# Patient Record
Sex: MALE | Race: WHITE | NOT HISPANIC OR LATINO | URBAN - METROPOLITAN AREA
[De-identification: names, ages, dates, MRNs, and addresses within clinical notes are randomized per-mention and may not be internally consistent; named-entity substitution may affect disease eponyms.]

---

## 2017-04-10 ENCOUNTER — EMERGENCY (EMERGENCY)
Facility: HOSPITAL | Age: 27
LOS: 1 days | Discharge: HOME | End: 2017-04-10
Attending: EMERGENCY MEDICINE
Payer: SELF-PAY

## 2017-04-10 VITALS
OXYGEN SATURATION: 100 % | DIASTOLIC BLOOD PRESSURE: 56 MMHG | SYSTOLIC BLOOD PRESSURE: 121 MMHG | TEMPERATURE: 99 F | HEART RATE: 66 BPM | HEIGHT: 70 IN | WEIGHT: 154.98 LBS | RESPIRATION RATE: 16 BRPM

## 2017-04-10 DIAGNOSIS — K11.20 SIALOADENITIS, UNSPECIFIED: ICD-10-CM

## 2017-04-10 DIAGNOSIS — Z88.0 ALLERGY STATUS TO PENICILLIN: ICD-10-CM

## 2017-04-10 PROCEDURE — 99283 EMERGENCY DEPT VISIT LOW MDM: CPT

## 2017-04-10 RX ORDER — OXYCODONE HYDROCHLORIDE 5 MG/1
1 TABLET ORAL
Qty: 6 | Refills: 0 | OUTPATIENT
Start: 2017-04-10 | End: 2017-04-13

## 2017-04-10 RX ORDER — EMTRICITABINE AND TENOFOVIR DISOPROXIL FUMARATE 200; 300 MG/1; MG/1
1 TABLET, FILM COATED ORAL
Qty: 30 | Refills: 0 | OUTPATIENT
Start: 2017-04-10 | End: 2017-05-10

## 2017-04-10 NOTE — ED ADULT NURSE NOTE - OBJECTIVE STATEMENT
STATES FOR THE PAST 4 DAYS , PATIENT HAS LEFT SIDE OF THROAT, WITH PUS AND BLEEDING , WITH CHILLS. STATES FOR THE PAST 4 DAYS , PATIENT HAS LEFT SIDE OF THROAT PAIN , WITH PUS AND BLEEDING  FROM LEFT SIDE OF THROAT. , WITH CHILLS. WITH HX OF LEFT SALIVARY GLANDS STONES WITH SURGERY.ABOUT A YEAR AGO. STATES FOR THE PAST 4 DAYS , PATIENT HAS LEFT SIDE OF THROAT PAIN , WITH PUS AND BLEEDING  FROM LEFT SIDE OF THROAT. , WITH CHILLS. WITH HX OF LEFT SALIVARY GLANDS STONES EXTRACTION  SURGERY.ABOUT 2 YEARS AGO.

## 2017-04-10 NOTE — ED PROVIDER NOTE - MEDICAL DECISION MAKING DETAILS
25 y/o M pt with Hx of salivary stones with severe infection requiring drainage; pt is afebrile, normal vital signs, well-appearing, on Abx's and has close f/u. Plan for pain control, and will speak with ENT. 25 y/o M pt with Hx of salivary stones with severe infection requiring drainage; pt is afebrile, normal vital signs, well-appearing, on Abx's and has close f/u. Plan for pain control, already has close ENT followup.

## 2017-04-10 NOTE — ED PROVIDER NOTE - PLAN OF CARE
Please take Motrin for pain (400-600mg every 6-8 hours). Take percocet for severe pain. Continue to take antibiotics.  Please follow up with your ENT doctor as scheduled for further evaluation. Return to the ER for increased pain, fever or any other concern.

## 2017-04-10 NOTE — ED PROVIDER NOTE - NS ED MD SCRIBE ATTENDING SCRIBE SECTIONS
REVIEW OF SYSTEMS/PHYSICAL EXAM/PAST MEDICAL/SURGICAL/SOCIAL HISTORY/HISTORY OF PRESENT ILLNESS/VITAL SIGNS( Pullset)/DISPOSITION/HIV

## 2017-04-10 NOTE — ED PROVIDER NOTE - CARE PLAN
Principal Discharge DX:	Salivary gland infection  Instructions for follow-up, activity and diet:	Please take Motrin for pain (400-600mg every 6-8 hours). Take percocet for severe pain. Continue to take antibiotics.  Please follow up with your ENT doctor as scheduled for further evaluation. Return to the ER for increased pain, fever or any other concern.

## 2017-04-10 NOTE — ED PROVIDER NOTE - DETAILS:
The scribe's documentation has been prepared under my direction and personally reviewed by me in its entirety. I confirm that the note above accurately reflects all work, treatment, procedures, and medical decision making performed by me. ESTEFANÍA Rico MD

## 2017-04-10 NOTE — ED PROVIDER NOTE - MOUTH
sublingual swelling and purulent drainage expressed from salivary gland; no submental tenderness, no erythema or warmth

## 2017-04-10 NOTE — ED PROVIDER NOTE - OBJECTIVE STATEMENT
25 y/o M pt with PMHx of Salivary Stones (s/p excision) and no PSHx presents to ED c/o oral pain, oral swelling, and sublingual discharge (pus and blood) x4 days. Pt states he had salivary gland stone excision with drainage approximately 2 years ago in which a 3mm stone lodged in his gland was removed; pt has not been able to see ENT physician who performed the procedure since then, but was informed that he does have two other salivary gland stones that should not cause any issues, as per ENT physician. Pt's pain returned 4 days ago, which prompted pt to visit an ED 3 days ago; pt was given Ibuprofen and Clindamycin. Pt reports chills at night as well. Pt denies fever, nausea, vomiting, rashes, or any other complaints. Allergies: Penicillin (angioedema).